# Patient Record
Sex: MALE | Race: BLACK OR AFRICAN AMERICAN | NOT HISPANIC OR LATINO | Employment: OTHER | URBAN - METROPOLITAN AREA
[De-identification: names, ages, dates, MRNs, and addresses within clinical notes are randomized per-mention and may not be internally consistent; named-entity substitution may affect disease eponyms.]

---

## 2017-03-09 ENCOUNTER — HOSPITAL ENCOUNTER (EMERGENCY)
Facility: HOSPITAL | Age: 47
Discharge: HOME | End: 2017-03-10
Attending: EMERGENCY MEDICINE

## 2017-03-09 DIAGNOSIS — S93.402A SPRAIN OF LEFT ANKLE, UNSPECIFIED LIGAMENT, INITIAL ENCOUNTER: ICD-10-CM

## 2017-03-09 DIAGNOSIS — S16.1XXA CERVICAL STRAIN, ACUTE, INITIAL ENCOUNTER: Primary | ICD-10-CM

## 2017-03-09 DIAGNOSIS — M25.572 LEFT ANKLE PAIN: ICD-10-CM

## 2017-03-09 DIAGNOSIS — M62.838 MUSCLE SPASM: ICD-10-CM
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
## 2017-03-09 PROCEDURE — 96372 THER/PROPH/DIAG INJ SC/IM: CPT

## 2017-03-09 PROCEDURE — 99283 EMERGENCY DEPT VISIT LOW MDM: CPT | Mod: 25
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
## 2017-03-10 VITALS
BODY MASS INDEX: 39.37 KG/M2 | RESPIRATION RATE: 18 BRPM | OXYGEN SATURATION: 98 % | SYSTOLIC BLOOD PRESSURE: 141 MMHG | HEIGHT: 66 IN | TEMPERATURE: 98 F | WEIGHT: 245 LBS | DIASTOLIC BLOOD PRESSURE: 91 MMHG | HEART RATE: 95 BPM

## 2017-03-10 PROCEDURE — 63600175 PHARM REV CODE 636 W HCPCS: Performed by: EMERGENCY MEDICINE

## 2017-03-10 RX ORDER — ORPHENADRINE CITRATE 30 MG/ML
60 INJECTION INTRAMUSCULAR; INTRAVENOUS
Status: COMPLETED | OUTPATIENT
Start: 2017-03-10 | End: 2017-03-10

## 2017-03-10 RX ORDER — IBUPROFEN 600 MG/1
600 TABLET ORAL EVERY 6 HOURS PRN
Qty: 30 TABLET | Refills: 0 | OUTPATIENT
Start: 2017-03-10 | End: 2022-10-14

## 2017-03-10 RX ORDER — KETOROLAC TROMETHAMINE 30 MG/ML
30 INJECTION, SOLUTION INTRAMUSCULAR; INTRAVENOUS
Status: COMPLETED | OUTPATIENT
Start: 2017-03-10 | End: 2017-03-10

## 2017-03-10 RX ORDER — DIAZEPAM 5 MG/1
5 TABLET ORAL EVERY 8 HOURS PRN
Qty: 15 TABLET | Refills: 0 | Status: SHIPPED | OUTPATIENT
Start: 2017-03-10 | End: 2017-04-09

## 2017-03-10 RX ADMIN — KETOROLAC TROMETHAMINE 30 MG: 30 INJECTION, SOLUTION INTRAMUSCULAR at 12:03

## 2017-03-10 RX ADMIN — ORPHENADRINE CITRATE 60 MG: 30 INJECTION INTRAMUSCULAR; INTRAVENOUS at 12:03
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
## 2017-03-10 NOTE — DISCHARGE INSTRUCTIONS
Apply ice to areas of pain at 20 minute intervals several times a day.  Wear ACE wrap to decrease pain and swelling in your ankle.  Follow up with your orthopedic for any concerns or worsening symptoms.  Take medications as directed.  DO not take valium while working.  You may take only at bedtime if needed.

## 2017-03-10 NOTE — ED NOTES
Patient identifiers for Rod Ivory checked and correct.  LOC: The patient is awake, alert and aware of environment with an appropriate affect, the patient is oriented x 3 and speaking appropriately.  APPEARANCE: Patient uncomfortable and in no acute distress.  SKIN: The skin is warm and dry, patient has normal skin turgor and moist mucus membranes.  MUSKULOSKELETAL: Patient moving all extremities well. Slight swelling to left ankle. + pedal pulse. Good movement and sensation in toes.  RESPIRATORY: Airway is open and patent, respirations are spontaneous, patient has a normal effort and rate.  ABDOMEN: Soft and non tender to palpation, no distention noted.  NEUROLOGIC: PERRL, facial expression is symmetrical, bilateral hand grasp equal and even, normal sensation in all extremities when touched with a finger.

## 2022-10-14 ENCOUNTER — HOSPITAL ENCOUNTER (EMERGENCY)
Facility: HOSPITAL | Age: 52
Discharge: HOME | End: 2022-10-14
Attending: EMERGENCY MEDICINE
Payer: MEDICAID

## 2022-10-14 VITALS
BODY MASS INDEX: 37.77 KG/M2 | HEART RATE: 78 BPM | DIASTOLIC BLOOD PRESSURE: 70 MMHG | HEIGHT: 66 IN | RESPIRATION RATE: 16 BRPM | OXYGEN SATURATION: 99 % | TEMPERATURE: 99 F | SYSTOLIC BLOOD PRESSURE: 129 MMHG | WEIGHT: 235 LBS

## 2022-10-14 DIAGNOSIS — S20.212A RIB CONTUSION, LEFT, INITIAL ENCOUNTER: ICD-10-CM

## 2022-10-14 DIAGNOSIS — R11.2 NAUSEA AND VOMITING, UNSPECIFIED VOMITING TYPE: ICD-10-CM

## 2022-10-14 DIAGNOSIS — F11.93 OPIOID WITHDRAWAL: Primary | ICD-10-CM

## 2022-10-14 DIAGNOSIS — F17.200 NEEDS SMOKING CESSATION EDUCATION: ICD-10-CM

## 2022-10-14 DIAGNOSIS — S20.219A CHEST WALL CONTUSION: ICD-10-CM

## 2022-10-14 PROCEDURE — 93010 ELECTROCARDIOGRAM REPORT: CPT | Mod: ,,, | Performed by: INTERNAL MEDICINE

## 2022-10-14 PROCEDURE — 63600175 PHARM REV CODE 636 W HCPCS: Performed by: PHYSICIAN ASSISTANT

## 2022-10-14 PROCEDURE — 99284 EMERGENCY DEPT VISIT MOD MDM: CPT | Mod: 25

## 2022-10-14 PROCEDURE — 99285 PR EMERGENCY DEPT VISIT,LEVEL V: ICD-10-PCS | Mod: ,,, | Performed by: PHYSICIAN ASSISTANT

## 2022-10-14 PROCEDURE — 93010 EKG 12-LEAD: ICD-10-PCS | Mod: ,,, | Performed by: INTERNAL MEDICINE

## 2022-10-14 PROCEDURE — 25000003 PHARM REV CODE 250: Performed by: PHYSICIAN ASSISTANT

## 2022-10-14 PROCEDURE — 96372 THER/PROPH/DIAG INJ SC/IM: CPT | Performed by: PHYSICIAN ASSISTANT

## 2022-10-14 PROCEDURE — 93005 ELECTROCARDIOGRAM TRACING: CPT

## 2022-10-14 PROCEDURE — 99285 EMERGENCY DEPT VISIT HI MDM: CPT | Mod: ,,, | Performed by: PHYSICIAN ASSISTANT

## 2022-10-14 RX ORDER — DICYCLOMINE HYDROCHLORIDE 10 MG/1
20 CAPSULE ORAL
Status: COMPLETED | OUTPATIENT
Start: 2022-10-14 | End: 2022-10-14

## 2022-10-14 RX ORDER — DICYCLOMINE HYDROCHLORIDE 20 MG/1
20 TABLET ORAL 2 TIMES DAILY
Qty: 10 TABLET | Refills: 0 | Status: SHIPPED | OUTPATIENT
Start: 2022-10-14 | End: 2022-10-19

## 2022-10-14 RX ORDER — DICYCLOMINE HYDROCHLORIDE 20 MG/1
20 TABLET ORAL 2 TIMES DAILY
Qty: 10 TABLET | Refills: 0 | Status: SHIPPED | OUTPATIENT
Start: 2022-10-14 | End: 2022-10-14 | Stop reason: SDUPTHER

## 2022-10-14 RX ORDER — NALOXONE HYDROCHLORIDE 4 MG/.1ML
SPRAY NASAL
Qty: 1 EACH | Refills: 11 | OUTPATIENT
Start: 2022-10-14 | End: 2023-02-12

## 2022-10-14 RX ORDER — ONDANSETRON 4 MG/1
4 TABLET, ORALLY DISINTEGRATING ORAL
Status: COMPLETED | OUTPATIENT
Start: 2022-10-14 | End: 2022-10-14

## 2022-10-14 RX ORDER — ONDANSETRON 4 MG/1
4 TABLET, ORALLY DISINTEGRATING ORAL EVERY 8 HOURS PRN
Qty: 12 TABLET | Refills: 0 | Status: SHIPPED | OUTPATIENT
Start: 2022-10-14 | End: 2022-10-18

## 2022-10-14 RX ORDER — ONDANSETRON 4 MG/1
4 TABLET, ORALLY DISINTEGRATING ORAL EVERY 8 HOURS PRN
Qty: 9 TABLET | Refills: 0 | Status: SHIPPED | OUTPATIENT
Start: 2022-10-14 | End: 2022-10-14 | Stop reason: SDUPTHER

## 2022-10-14 RX ORDER — METHOCARBAMOL 500 MG/1
1000 TABLET, FILM COATED ORAL EVERY 8 HOURS PRN
Qty: 15 TABLET | Refills: 0 | Status: SHIPPED | OUTPATIENT
Start: 2022-10-14 | End: 2022-10-19

## 2022-10-14 RX ORDER — KETOROLAC TROMETHAMINE 30 MG/ML
15 INJECTION, SOLUTION INTRAMUSCULAR; INTRAVENOUS
Status: COMPLETED | OUTPATIENT
Start: 2022-10-14 | End: 2022-10-14

## 2022-10-14 RX ORDER — IBUPROFEN 600 MG/1
600 TABLET ORAL EVERY 8 HOURS PRN
Qty: 15 TABLET | Refills: 0 | Status: SHIPPED | OUTPATIENT
Start: 2022-10-14 | End: 2022-10-19

## 2022-10-14 RX ADMIN — DICYCLOMINE HYDROCHLORIDE 20 MG: 10 CAPSULE ORAL at 11:10

## 2022-10-14 RX ADMIN — ONDANSETRON 4 MG: 4 TABLET, ORALLY DISINTEGRATING ORAL at 11:10

## 2022-10-14 RX ADMIN — KETOROLAC TROMETHAMINE 15 MG: 30 INJECTION, SOLUTION INTRAMUSCULAR; INTRAVENOUS at 11:10
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
## 2022-10-14 NOTE — ED NOTES
Pt identifiers Rod Ivory were checked and are correct  LOC: The patient is awake, alert, aware of environment with an appropriate affect. Oriented x4, speaking appropriately  APPEARANCE: Pt rates pain to left ribs an 8/10 , in no acute distress, pt is clean and well groomed, clothing properly fastened  SKIN: Skin warm, dry and intact, normal skin turgor, moist mucus membranes  RESPIRATORY: Airway is open and patent, respirations are spontaneous, even and unlabored, normal effort and rate  CARDIAC: Normal rate and rhythm, no peripheral edema noted, capillary refill < 3 seconds, bilateral radial pulses 2+  ABDOMEN: Soft, nontender, nondistended. Bowel sounds present to all four quad of abd on auscultation  NEUROLOGIC: PERRL, facial expression is symmetrical, patient moving all extremities spontaneously, normal sensation in all extremities when touched with a finger.  Follows all commands appropriately  MUSCULOSKELETAL: No obvious deformities.        Rest of the records received and placed in your inbox.

## 2023-02-12 ENCOUNTER — HOSPITAL ENCOUNTER (EMERGENCY)
Facility: HOSPITAL | Age: 53
Discharge: HOME | End: 2023-02-12
Attending: EMERGENCY MEDICINE
Payer: MEDICAID

## 2023-02-12 VITALS
HEART RATE: 68 BPM | TEMPERATURE: 98 F | RESPIRATION RATE: 20 BRPM | DIASTOLIC BLOOD PRESSURE: 91 MMHG | SYSTOLIC BLOOD PRESSURE: 159 MMHG | OXYGEN SATURATION: 98 %

## 2023-02-12 DIAGNOSIS — F19.10 POLYSUBSTANCE ABUSE: Primary | ICD-10-CM

## 2023-02-12 DIAGNOSIS — N17.9 AKI (ACUTE KIDNEY INJURY): ICD-10-CM

## 2023-02-12 DIAGNOSIS — R07.9 CHEST PAIN: ICD-10-CM

## 2023-02-12 DIAGNOSIS — E86.0 DEHYDRATION: ICD-10-CM

## 2023-02-12 DIAGNOSIS — L98.9 BUMPS ON SKIN: ICD-10-CM

## 2023-02-12 LAB
ALBUMIN SERPL BCP-MCNC: 3.7 G/DL (ref 3.5–5.2)
ALP SERPL-CCNC: 75 U/L (ref 55–135)
ALT SERPL W/O P-5'-P-CCNC: 8 U/L (ref 10–44)
ANION GAP SERPL CALC-SCNC: 13 MMOL/L (ref 8–16)
AST SERPL-CCNC: 11 U/L (ref 10–40)
BASOPHILS NFR BLD: 0 % (ref 0–1.9)
BILIRUB SERPL-MCNC: 0.8 MG/DL (ref 0.1–1)
BUN SERPL-MCNC: 14 MG/DL (ref 6–20)
CALCIUM SERPL-MCNC: 9 MG/DL (ref 8.7–10.5)
CHLORIDE SERPL-SCNC: 109 MMOL/L (ref 95–110)
CO2 SERPL-SCNC: 20 MMOL/L (ref 23–29)
CREAT SERPL-MCNC: 1.6 MG/DL (ref 0.5–1.4)
DIFFERENTIAL METHOD: ABNORMAL
EOSINOPHIL NFR BLD: 3 % (ref 0–8)
ERYTHROCYTE [DISTWIDTH] IN BLOOD BY AUTOMATED COUNT: 13.3 % (ref 11.5–14.5)
EST. GFR  (NO RACE VARIABLE): 52 ML/MIN/1.73 M^2
GLUCOSE SERPL-MCNC: 124 MG/DL (ref 70–110)
HCT VFR BLD AUTO: 40 % (ref 40–54)
HGB BLD-MCNC: 13.7 G/DL (ref 14–18)
IMM GRANULOCYTES # BLD AUTO: ABNORMAL K/UL (ref 0–0.04)
IMM GRANULOCYTES NFR BLD AUTO: ABNORMAL % (ref 0–0.5)
LYMPHOCYTES NFR BLD: 20 % (ref 18–48)
MCH RBC QN AUTO: 30 PG (ref 27–31)
MCHC RBC AUTO-ENTMCNC: 34.3 G/DL (ref 32–36)
MCV RBC AUTO: 88 FL (ref 82–98)
MONOCYTES NFR BLD: 6 % (ref 4–15)
NEUTROPHILS NFR BLD: 71 % (ref 38–73)
NRBC BLD-RTO: 0 /100 WBC
PLATELET # BLD AUTO: 252 K/UL (ref 150–450)
PLATELET BLD QL SMEAR: ABNORMAL
PMV BLD AUTO: 11.8 FL (ref 9.2–12.9)
POTASSIUM SERPL-SCNC: 4.3 MMOL/L (ref 3.5–5.1)
PROT SERPL-MCNC: 7.1 G/DL (ref 6–8.4)
RBC # BLD AUTO: 4.57 M/UL (ref 4.6–6.2)
SODIUM SERPL-SCNC: 142 MMOL/L (ref 136–145)
TARGETS BLD QL SMEAR: ABNORMAL
TROPONIN I SERPL DL<=0.01 NG/ML-MCNC: 0.01 NG/ML (ref 0–0.03)
TROPONIN I SERPL DL<=0.01 NG/ML-MCNC: 0.01 NG/ML (ref 0–0.03)
WBC # BLD AUTO: 12.8 K/UL (ref 3.9–12.7)

## 2023-02-12 PROCEDURE — 84484 ASSAY OF TROPONIN QUANT: CPT | Performed by: EMERGENCY MEDICINE

## 2023-02-12 PROCEDURE — 85007 BL SMEAR W/DIFF WBC COUNT: CPT | Performed by: EMERGENCY MEDICINE

## 2023-02-12 PROCEDURE — 85027 COMPLETE CBC AUTOMATED: CPT | Performed by: EMERGENCY MEDICINE

## 2023-02-12 PROCEDURE — 96360 HYDRATION IV INFUSION INIT: CPT

## 2023-02-12 PROCEDURE — 93010 ELECTROCARDIOGRAM REPORT: CPT | Mod: ,,, | Performed by: INTERNAL MEDICINE

## 2023-02-12 PROCEDURE — 93010 EKG 12-LEAD: ICD-10-PCS | Mod: ,,, | Performed by: INTERNAL MEDICINE

## 2023-02-12 PROCEDURE — 80053 COMPREHEN METABOLIC PANEL: CPT | Performed by: EMERGENCY MEDICINE

## 2023-02-12 PROCEDURE — 84484 ASSAY OF TROPONIN QUANT: CPT | Mod: 91 | Performed by: EMERGENCY MEDICINE

## 2023-02-12 PROCEDURE — 99285 EMERGENCY DEPT VISIT HI MDM: CPT | Mod: 25

## 2023-02-12 PROCEDURE — 93005 ELECTROCARDIOGRAM TRACING: CPT

## 2023-02-12 PROCEDURE — 25000003 PHARM REV CODE 250: Performed by: EMERGENCY MEDICINE

## 2023-02-12 RX ORDER — ACETAMINOPHEN 500 MG
1000 TABLET ORAL
Status: COMPLETED | OUTPATIENT
Start: 2023-02-12 | End: 2023-02-12

## 2023-02-12 RX ORDER — ASPIRIN 325 MG
325 TABLET ORAL
Status: DISCONTINUED | OUTPATIENT
Start: 2023-02-12 | End: 2023-02-13 | Stop reason: HOSPADM

## 2023-02-12 RX ORDER — NALOXONE HYDROCHLORIDE 1 MG/ML
INJECTION INTRAMUSCULAR; INTRAVENOUS; SUBCUTANEOUS
Qty: 2 ML | Refills: 11 | Status: SHIPPED | OUTPATIENT
Start: 2023-02-12 | End: 2023-02-12 | Stop reason: SDUPTHER

## 2023-02-12 RX ORDER — NALOXONE HYDROCHLORIDE 1 MG/ML
INJECTION INTRAMUSCULAR; INTRAVENOUS; SUBCUTANEOUS
Qty: 2 ML | Refills: 11 | Status: SHIPPED | OUTPATIENT
Start: 2023-02-12

## 2023-02-12 RX ORDER — NALOXONE HYDROCHLORIDE 4 MG/.1ML
SPRAY NASAL
Qty: 1 EACH | Refills: 11 | Status: SHIPPED | OUTPATIENT
Start: 2023-02-12 | End: 2023-02-12 | Stop reason: SDUPTHER

## 2023-02-12 RX ADMIN — ACETAMINOPHEN 1000 MG: 500 TABLET ORAL at 07:02

## 2023-02-12 RX ADMIN — SODIUM CHLORIDE 1000 ML: 0.9 INJECTION, SOLUTION INTRAVENOUS at 07:02

## 2023-02-13 NOTE — PROVIDER PROGRESS NOTES - EMERGENCY DEPT.
Encounter Date: 2/12/2023    ED Physician Progress Notes            This is an assumption of care note.     Upon shift change, the patient was transferred to me from Barb Bella MD at 8:00 PM in stable condition.     Rod Ivory is a 52 y.o. male who  has a past medical history of Hypertension.  Patient presented to ED with chief complaint of chest pain after smoking crack cocaine.  Workup currently in progress.   Labs including troponin pending at this time.    Update:   No acute events during shift.    Vitals have remained stable.  Initial and delta troponin negative.    Patient requesting admission to rehab facility. Denies SI/HI.   Will provide resources on DC. Narcan prescribed, instructed on use.      VITALS:  Vitals:    02/12/23 1810 02/12/23 1814 02/12/23 2103 02/12/23 2202   BP:   (!) 151/74 137/85   Pulse: 72 77 61 61   Resp: (!) 23 15 19 (!) 22   Temp:       SpO2: 97% 96% 97% 98%         EKG interpretation by ED attending physician:  NSR, rate 98, occasional PACs, RBBB, no ST changes, no acute ischemia, normal intervals.  Compared with prior EKG dated 10/2022, grossly stable without significant change.      IMAGING:  Imaging Results              X-Ray Chest AP Portable (Final result)  Result time 02/12/23 20:09:34      Final result by Raymond Ross MD (02/12/23 20:09:34)                   Impression:      1. No acute cardiopulmonary process.      Electronically signed by: Raymond Ross MD  Date:    02/12/2023  Time:    20:09               Narrative:    EXAMINATION:  XR CHEST AP PORTABLE    CLINICAL HISTORY:  Chest pain, unspecified    TECHNIQUE:  Single frontal view of the chest was performed.    COMPARISON:  10/14/2022    FINDINGS:  The cardiomediastinal silhouette is not enlarged.  There is no pleural effusion.  The trachea is midline.  The lungs are symmetrically expanded bilaterally without evidence of acute parenchymal process. No large focal consolidation seen.  There may be a  calcified granuloma projected over the right midlung zone versus material extraneous to the patient.  There is no pneumothorax.  The osseous structures are remarkable for degenerative changes..                                        LABS:  Labs Reviewed   CBC W/ AUTO DIFFERENTIAL - Abnormal; Notable for the following components:       Result Value    WBC 12.80 (*)     RBC 4.57 (*)     Hemoglobin 13.7 (*)     All other components within normal limits   COMPREHENSIVE METABOLIC PANEL - Abnormal; Notable for the following components:    CO2 20 (*)     Glucose 124 (*)     Creatinine 1.6 (*)     ALT 8 (*)     eGFR 52 (*)     All other components within normal limits   TROPONIN I   TROPONIN I         MEDICATIONS:  Medications   aspirin tablet 325 mg (325 mg Oral Not Given 2/12/23 1730)   sodium chloride 0.9% bolus 1,000 mL 1,000 mL (0 mLs Intravenous Stopped 2/12/23 2019)   acetaminophen tablet 1,000 mg (1,000 mg Oral Given 2/12/23 1927)         IMPRESSION:  1. Polysubstance abuse    2. Chest pain    3. CAPO (acute kidney injury)    4. Bumps on skin    5. Dehydration           DISCHARGE MEDICATIONS:  Current Discharge Medication List            DISPOSITION:  D/C in stable condition.

## 2023-02-13 NOTE — ED PROVIDER NOTES
Encounter Date: 2/12/2023       History     Chief Complaint   Patient presents with    Chest Pain     Pt presents to ED today via [facility] from [facility]   C/o chest pain after snorting crack  Pt reports he is also detoxing from heroin     HPI  52-year-old male with a history of hypertension, substance use disorder, presenting with chest pain after snorting crack, smoking weed, and snorting heroin.  Patient denies chest pain currently.  Not interested in quitting.  Denies shortness of breath.  Also complaining of multiple lesions all over his body for the past few months.  They are bothersome, he does not know where they came from.  Has not seen a physician because it.  Review of patient's allergies indicates:  No Known Allergies  Past Medical History:   Diagnosis Date    Hypertension      Past Surgical History:   Procedure Laterality Date    ANKLE SURGERY Left      No family history on file.  Social History     Tobacco Use    Smoking status: Some Days     Packs/day: 0.25     Types: Cigarettes    Smokeless tobacco: Never   Substance Use Topics    Alcohol use: No    Drug use: Yes     Types: Marijuana     Comment: heroin     Review of Systems   Constitutional:  Negative for fever.   HENT:  Negative for sore throat.    Respiratory:  Negative for shortness of breath.    Cardiovascular:  Positive for chest pain.   Gastrointestinal:  Negative for nausea.   Genitourinary:  Negative for dysuria.   Musculoskeletal:  Negative for back pain.   Skin:  Negative for rash.   Neurological:  Negative for weakness.   Hematological:  Does not bruise/bleed easily.     Physical Exam     Initial Vitals   BP Pulse Resp Temp SpO2   02/12/23 1704 02/12/23 1704 02/12/23 1704 02/12/23 1721 02/12/23 1704   (!) 90/50 110 18 97.8 °F (36.6 °C) 96 %      MAP       --                Physical Exam    Nursing note and vitals reviewed.  Constitutional:   Narrative: Triage vital signs reviewed.     Constitutional: Well-nourished, well-developed. Not in  acute distress.     HEENT: Normocephalic, atraumatic. Moist mucous membranes.     Eyes: No conjunctival injection.     Resp: Normal respiratory effort, breathing unlabored.     Cardio: Regular rate and rhythm.     GI: Abdomen non-distended.      MSK: Extremities without any deformities noted. No lower extremity edema.     Skin: Warm and dry.  Multiple circular lesions all over body.  Area behind left ear open, not draining.  No signs of infection.     Neuro: Awake and alert. Moves all extremities.         ED Course   Procedures  Labs Reviewed   CBC W/ AUTO DIFFERENTIAL - Abnormal; Notable for the following components:       Result Value    WBC 12.80 (*)     RBC 4.57 (*)     Hemoglobin 13.7 (*)     All other components within normal limits   COMPREHENSIVE METABOLIC PANEL - Abnormal; Notable for the following components:    CO2 20 (*)     Glucose 124 (*)     Creatinine 1.6 (*)     ALT 8 (*)     eGFR 52 (*)     All other components within normal limits   TROPONIN I   TROPONIN I     EKG Readings: (Independently Interpreted)   Sinus rhythm, PACs, L axis, RBBB seen previously. No STEMI.     Imaging Results              X-Ray Chest AP Portable (In process)                      Medications   aspirin tablet 325 mg (325 mg Oral Not Given 2/12/23 1730)   sodium chloride 0.9% bolus 1,000 mL 1,000 mL (1,000 mLs Intravenous New Bag 2/12/23 1919)   acetaminophen tablet 1,000 mg (1,000 mg Oral Given 2/12/23 1927)     Medical Decision Making:   History:   Old Medical Records: I decided to obtain old medical records.  Old Records Summarized: records from previous admission(s).  Initial Assessment:   Nontoxic appearing.  Patient more concern with the lesions on his body than his chest pain.  States that he smoked heroin, marijuana, started crack.  Denies current chest pain.  Not interested in quitting.  Differential Diagnosis:   ACS, Pna, ptx   Clinical Tests:   Lab Tests: Ordered and Reviewed  Radiological Study: Ordered and  Reviewed  Medical Tests: Ordered and Reviewed  ED Management:  52m pw chest pain after using multiple drugs. Initially hypotensive. Labs show slight CAPO. Leukocytosis improved from previous. Xr pending. Will obtain 2 trops and if neg, dc with narcan. Skin lesions are not infected; will refer to Derm. Signout given to oncoming physician re: 2nd trop and XR. And disposition pending that.                         Clinical Impression:   Final diagnoses:  [R07.9] Chest pain  [F19.10] Polysubstance abuse (Primary)  [N17.9] CAPO (acute kidney injury)  [L98.9] Bumps on skin  [E86.0] Dehydration               Barb Bella MD  02/12/23 2007

## 2023-02-13 NOTE — DISCHARGE INSTRUCTIONS
You came in with chest pain and your workup showed you are dehydrated. Please stop using drugs. Narcan has been prescribed to you. It is an opioid reversal agent.  Please fill this prescription to have on hand at all times in the event of a future overdose.    We will also provide a list of resources for outpatient rehabilitation if desired.  The only way to prevent and overdoses to stop using entirely.    A Dermatology referral has been placed for you to evaluate the bumps on your skin. Please see your doctor within the week to follow up on your visit to the ED. If you do not have a primary doctor, call the number below to find one.     [facility]  [phone]  [address], [facility] [address]   [facility] [address]

## 2023-02-17 ENCOUNTER — HOSPITAL ENCOUNTER (EMERGENCY)
Facility: HOSPITAL | Age: 53
Discharge: HOME | End: 2023-02-17
Attending: EMERGENCY MEDICINE
Payer: MEDICAID

## 2023-02-17 VITALS
DIASTOLIC BLOOD PRESSURE: 79 MMHG | WEIGHT: 235 LBS | HEIGHT: 66 IN | RESPIRATION RATE: 20 BRPM | OXYGEN SATURATION: 100 % | TEMPERATURE: 98 F | SYSTOLIC BLOOD PRESSURE: 154 MMHG | HEART RATE: 68 BPM | BODY MASS INDEX: 37.77 KG/M2

## 2023-02-17 DIAGNOSIS — I49.1 PAC (PREMATURE ATRIAL CONTRACTION): Primary | ICD-10-CM

## 2023-02-17 DIAGNOSIS — Z00.8 MEDICAL CLEARANCE FOR PSYCHIATRIC ADMISSION: ICD-10-CM

## 2023-02-17 DIAGNOSIS — R21 SKIN ERUPTION: ICD-10-CM

## 2023-02-17 LAB
ALBUMIN SERPL BCP-MCNC: 3.5 G/DL (ref 3.5–5.2)
ALP SERPL-CCNC: 76 U/L (ref 55–135)
ALT SERPL W/O P-5'-P-CCNC: 8 U/L (ref 10–44)
ANION GAP SERPL CALC-SCNC: 10 MMOL/L (ref 8–16)
AST SERPL-CCNC: 13 U/L (ref 10–40)
BASOPHILS # BLD AUTO: 0.06 K/UL (ref 0–0.2)
BASOPHILS NFR BLD: 0.5 % (ref 0–1.9)
BILIRUB SERPL-MCNC: 0.4 MG/DL (ref 0.1–1)
BUN SERPL-MCNC: 12 MG/DL (ref 6–20)
CALCIUM SERPL-MCNC: 8.5 MG/DL (ref 8.7–10.5)
CHLORIDE SERPL-SCNC: 109 MMOL/L (ref 95–110)
CO2 SERPL-SCNC: 25 MMOL/L (ref 23–29)
CREAT SERPL-MCNC: 1 MG/DL (ref 0.5–1.4)
DIFFERENTIAL METHOD: ABNORMAL
EOSINOPHIL # BLD AUTO: 0.3 K/UL (ref 0–0.5)
EOSINOPHIL NFR BLD: 2.9 % (ref 0–8)
ERYTHROCYTE [DISTWIDTH] IN BLOOD BY AUTOMATED COUNT: 13.5 % (ref 11.5–14.5)
EST. GFR  (NO RACE VARIABLE): >60 ML/MIN/1.73 M^2
GLUCOSE SERPL-MCNC: 74 MG/DL (ref 70–110)
HCT VFR BLD AUTO: 40.6 % (ref 40–54)
HGB BLD-MCNC: 13.6 G/DL (ref 14–18)
IMM GRANULOCYTES # BLD AUTO: 0.04 K/UL (ref 0–0.04)
IMM GRANULOCYTES NFR BLD AUTO: 0.4 % (ref 0–0.5)
LYMPHOCYTES # BLD AUTO: 2.7 K/UL (ref 1–4.8)
LYMPHOCYTES NFR BLD: 24.4 % (ref 18–48)
MCH RBC QN AUTO: 29.8 PG (ref 27–31)
MCHC RBC AUTO-ENTMCNC: 33.5 G/DL (ref 32–36)
MCV RBC AUTO: 89 FL (ref 82–98)
MONOCYTES # BLD AUTO: 0.5 K/UL (ref 0.3–1)
MONOCYTES NFR BLD: 4 % (ref 4–15)
NEUTROPHILS # BLD AUTO: 7.6 K/UL (ref 1.8–7.7)
NEUTROPHILS NFR BLD: 67.8 % (ref 38–73)
NRBC BLD-RTO: 0 /100 WBC
PLATELET # BLD AUTO: 253 K/UL (ref 150–450)
PMV BLD AUTO: 12.4 FL (ref 9.2–12.9)
POTASSIUM SERPL-SCNC: 3.8 MMOL/L (ref 3.5–5.1)
PROT SERPL-MCNC: 6.8 G/DL (ref 6–8.4)
RBC # BLD AUTO: 4.57 M/UL (ref 4.6–6.2)
SODIUM SERPL-SCNC: 144 MMOL/L (ref 136–145)
WBC # BLD AUTO: 11.22 K/UL (ref 3.9–12.7)

## 2023-02-17 PROCEDURE — 80053 COMPREHEN METABOLIC PANEL: CPT | Performed by: EMERGENCY MEDICINE

## 2023-02-17 PROCEDURE — 93010 EKG 12-LEAD: ICD-10-PCS | Mod: ,,, | Performed by: INTERNAL MEDICINE

## 2023-02-17 PROCEDURE — 99284 EMERGENCY DEPT VISIT MOD MDM: CPT

## 2023-02-17 PROCEDURE — 93005 ELECTROCARDIOGRAM TRACING: CPT

## 2023-02-17 PROCEDURE — 85025 COMPLETE CBC W/AUTO DIFF WBC: CPT | Performed by: EMERGENCY MEDICINE

## 2023-02-17 PROCEDURE — 93010 ELECTROCARDIOGRAM REPORT: CPT | Mod: ,,, | Performed by: INTERNAL MEDICINE

## 2023-02-17 RX ORDER — PRENATAL VIT 91/IRON/FOLIC/DHA 28-975-200
COMBINATION PACKAGE (EA) ORAL 2 TIMES DAILY
Qty: 15 G | Refills: 5 | Status: SHIPPED | OUTPATIENT
Start: 2023-02-17

## 2023-02-17 NOTE — ED TRIAGE NOTES
Pt presents to ED from [facility] for medical clearance. Pt has a history of irregular HR and the rehab facility requires pt to be medically cleared prior to accepting him to start his rehab. Pt denies cp, sob, n/v/d. Pt states his only concern is the abnormal skin growths which he has developed over the last few weeks.

## 2023-02-17 NOTE — DISCHARGE INSTRUCTIONS
Your heart rate is irregular because you have premature atrial contractions.  No further medical need urgently at this time.  Your clear to go back to the facility.  Return if you have chest pain or passing out or shortness of breath.

## 2023-02-17 NOTE — ED PROVIDER NOTES
Encounter Date: 2/17/2023       History     Chief Complaint   Patient presents with    Irregular Heart Beat     Pt at [facility] for treatment after an overdose this past week where he was seen at [facility] and cleared, staff was concerned because during assessment pt was noted to have an irregular heartbeat. Pt states that he has a hx of A-fib and has no complaint at all. He denies CP no SOB no nausea or vomiting. Staff requesting medical clearance.      Chief complaint:  Medical clearance     History of present illness:  Patient presents from rehab for recent overdose treatment.  Patient is asymptomatic today in terms of new symptoms.  No chest pain or palpitations.  No shortness of breath.  No lower extremity edema.  No history of DVT/PE.  No nausea or vomiting or abdominal pain.  Reportedly, patient states he has a history of atrial fibrillation.  He had an irregular heart beat noted on exam at the facility where he is.  They sent him here for further evaluation and medical clearance.    Review of patient's allergies indicates:  No Known Allergies  Past Medical History:   Diagnosis Date    Hypertension      Past Surgical History:   Procedure Laterality Date    ANKLE SURGERY Left      No family history on file.  Social History     Tobacco Use    Smoking status: Some Days     Packs/day: 0.25     Types: Cigarettes    Smokeless tobacco: Never   Substance Use Topics    Alcohol use: No    Drug use: Yes     Types: Marijuana     Comment: heroin     Review of Systems   Constitutional:  Negative for fever.   HENT:  Negative for sore throat.    Respiratory:  Negative for shortness of breath.    Cardiovascular:  Negative for chest pain.   Gastrointestinal:  Negative for nausea.   Genitourinary:  Negative for dysuria.   Musculoskeletal:  Negative for back pain.   Skin:  Negative for rash.   Neurological:  Negative for weakness.   Hematological:  Does not bruise/bleed easily.   Psychiatric/Behavioral:  Negative for agitation and  behavioral problems.      Physical Exam     Initial Vitals [02/17/23 1226]   BP Pulse Resp Temp SpO2   (!) 176/98 78 18 98.1 °F (36.7 °C) 98 %      MAP       --         Physical Exam    Nursing note and vitals reviewed.  Constitutional: He appears well-developed and well-nourished. He is not diaphoretic. No distress.   HENT:   Head: Normocephalic and atraumatic.   Right Ear: External ear normal.   Left Ear: External ear normal.   Nose: Nose normal.   Mouth/Throat: Oropharynx is clear and moist.   Eyes: Conjunctivae and EOM are normal. Pupils are equal, round, and reactive to light. Right eye exhibits no discharge. Left eye exhibits no discharge. No scleral icterus.   Neck: Neck supple. No JVD present.   Normal range of motion.  Cardiovascular:  Normal rate, normal heart sounds and intact distal pulses.     Exam reveals no gallop and no friction rub.       No murmur heard.  Normal rate, irregular rhythm.   Pulmonary/Chest: Breath sounds normal. No stridor. No respiratory distress. He has no wheezes. He has no rhonchi. He has no rales. He exhibits no tenderness.   Abdominal: Abdomen is soft. Bowel sounds are normal. He exhibits no distension and no mass. There is no abdominal tenderness. There is no rebound and no guarding.   Musculoskeletal:         General: No tenderness or edema. Normal range of motion.      Cervical back: Normal range of motion and neck supple.     Neurological: He is alert and oriented to person, place, and time. He has normal strength. No cranial nerve deficit or sensory deficit.   Skin: Skin is warm and dry. No rash noted. No erythema. No pallor.   Psychiatric: He has a normal mood and affect. His behavior is normal. Judgment and thought content normal.       ED Course   Procedures  Labs Reviewed   CBC W/ AUTO DIFFERENTIAL - Abnormal; Notable for the following components:       Result Value    RBC 4.57 (*)     Hemoglobin 13.6 (*)     All other components within normal limits   COMPREHENSIVE  "METABOLIC PANEL - Abnormal; Notable for the following components:    Calcium 8.5 (*)     ALT 8 (*)     All other components within normal limits     EKG Readings: (Independently Interpreted)   Initial Reading: No STEMI. Ectopy: No Ectopy.   EKG reviewed and interpreted by me shows sinus rhythm with PACs.  Rate of 74.  There is a right bundle-branch block and left axis deviation.  There is left axis deviation.  There are no ST segment or T-wave ischemic findings.     ECG Results              EKG 12-lead (Final result)  Result time 02/17/23 15:18:27      Final result by Interface, Lab In [facility] (02/17/23 15:18:27)                   Narrative:    Test Reason : Z00.8,    Vent. Rate : 074 BPM     Atrial Rate : 074 BPM     P-R Int : 138 ms          QRS Dur : 132 ms      QT Int : 368 ms       P-R-T Axes : 070 -53 041 degrees     QTc Int : 408 ms    Sinus rhythm with Premature atrial complexes  Left axis deviation  Right bundle branch block  Abnormal ECG  When compared with ECG of 12-FEB-2023 17:18,  Significant changes have occurred  Confirmed by Gerhard Portillo MD ([id number]) on 2/17/2023 3:18:14 PM    Referred By: [email or web address]   SELF           Confirmed By:Gerhard Portillo MD                                  Imaging Results    None          Medications - No data to display  Medical Decision Making:   ED Management:  This is the emergent evaluation of a 52-year-old male presents emergency department for evaluation of a 52-year-old male presents emergency department with no complaints.  Using for medical clearance.  He was found to have a "irregular heartbeat" on evaluation at detox facility.  Differential diagnosis at the time of initial evaluation included, but was not limited to:   PACs, PVCs, atrial fibrillation, atrial flutter.  The patient has EKG consistent with right bundle-branch block and PACs.  He does not have any evidence of atrial fibrillation or atrial flutter.  He is stable for discharge.  Advised return for any new or " worsening symptoms.  Labs without acute abnormalities.    Of note, patient at discharge as nurse if I could evaluate his skin lesions.  He states that he has a lesion to his neck and umbilicus.  Unclear what these are but they do not appear to be infection/cellulitis/abscess.  No evidence of herpetic lesions.  Most consistent with fungal infection.  I have referred him to Dermatology and prescribed topical antifungal medication.                        Clinical Impression:   Final diagnoses:  [Z00.8] Medical clearance for psychiatric admission  [I49.1] PAC (premature atrial contraction) (Primary)  [R21] Skin eruption        ED Disposition Condition    Discharge Stable          ED Prescriptions       Medication Sig Dispense Start Date End Date Auth. Provider    terbinafine HCL (LAMISIL) 1 % cream Apply topically 2 (two) times daily. 15 g 2/17/2023 -- Dony Ojeda Jr., MD          Follow-up Information       Follow up With Specialties Details Why Contact Info    [facility] Dermatology Call in 1 week  [address] [facility] [address]  [phone]             Dony Ojeda Jr., MD  02/17/23 2445       Dony Ojeda Jr., MD  02/18/23 [id number]

## 2024-08-04 ENCOUNTER — HOSPITAL ENCOUNTER (EMERGENCY)
Facility: HOSPITAL | Age: 54
Discharge: HOME | End: 2024-08-04
Attending: EMERGENCY MEDICINE
Payer: MEDICAID

## 2024-08-04 VITALS
TEMPERATURE: 98 F | BODY MASS INDEX: 38.57 KG/M2 | HEIGHT: 66 IN | RESPIRATION RATE: 20 BRPM | WEIGHT: 240 LBS | DIASTOLIC BLOOD PRESSURE: 93 MMHG | HEART RATE: 96 BPM | SYSTOLIC BLOOD PRESSURE: 167 MMHG | OXYGEN SATURATION: 96 %

## 2024-08-04 DIAGNOSIS — R07.9 CHEST PAIN: ICD-10-CM

## 2024-08-04 DIAGNOSIS — J22 LOWER RESPIRATORY TRACT INFECTION: Primary | ICD-10-CM

## 2024-08-04 LAB
ALBUMIN SERPL BCP-MCNC: 3.9 G/DL (ref 3.5–5.2)
ALP SERPL-CCNC: 113 U/L (ref 55–135)
ALT SERPL W/O P-5'-P-CCNC: 17 U/L (ref 10–44)
ANION GAP SERPL CALC-SCNC: 12 MMOL/L (ref 8–16)
AST SERPL-CCNC: 16 U/L (ref 10–40)
BASOPHILS # BLD AUTO: ABNORMAL K/UL (ref 0–0.2)
BASOPHILS NFR BLD: 0 % (ref 0–1.9)
BILIRUB SERPL-MCNC: 0.4 MG/DL (ref 0.1–1)
BUN SERPL-MCNC: 15 MG/DL (ref 6–20)
CALCIUM SERPL-MCNC: 10 MG/DL (ref 8.7–10.5)
CHLORIDE SERPL-SCNC: 105 MMOL/L (ref 95–110)
CO2 SERPL-SCNC: 25 MMOL/L (ref 23–29)
CREAT SERPL-MCNC: 1.2 MG/DL (ref 0.5–1.4)
CTP QC/QA: YES
CTP QC/QA: YES
DIFFERENTIAL METHOD BLD: ABNORMAL
EOSINOPHIL # BLD AUTO: ABNORMAL K/UL (ref 0–0.5)
EOSINOPHIL NFR BLD: 43 % (ref 0–8)
ERYTHROCYTE [DISTWIDTH] IN BLOOD BY AUTOMATED COUNT: 13.5 % (ref 11.5–14.5)
EST. GFR  (NO RACE VARIABLE): >60 ML/MIN/1.73 M^2
GLUCOSE SERPL-MCNC: 97 MG/DL (ref 70–110)
HCT VFR BLD AUTO: 42.9 % (ref 40–54)
HGB BLD-MCNC: 15.1 G/DL (ref 14–18)
IMM GRANULOCYTES # BLD AUTO: ABNORMAL K/UL (ref 0–0.04)
IMM GRANULOCYTES NFR BLD AUTO: ABNORMAL % (ref 0–0.5)
LYMPHOCYTES # BLD AUTO: ABNORMAL K/UL (ref 1–4.8)
LYMPHOCYTES NFR BLD: 19 % (ref 18–48)
MCH RBC QN AUTO: 31.4 PG (ref 27–31)
MCHC RBC AUTO-ENTMCNC: 35.2 G/DL (ref 32–36)
MCV RBC AUTO: 89 FL (ref 82–98)
MONOCYTES # BLD AUTO: ABNORMAL K/UL (ref 0.3–1)
MONOCYTES NFR BLD: 0 % (ref 4–15)
NEUTROPHILS # BLD AUTO: ABNORMAL K/UL (ref 1.8–7.7)
NEUTROPHILS NFR BLD: 38 % (ref 38–73)
NRBC BLD-RTO: 0 /100 WBC
PLATELET # BLD AUTO: 244 K/UL (ref 150–450)
PLATELET BLD QL SMEAR: ABNORMAL
PMV BLD AUTO: 12.1 FL (ref 9.2–12.9)
POC MOLECULAR INFLUENZA A AGN: NEGATIVE
POC MOLECULAR INFLUENZA B AGN: NEGATIVE
POTASSIUM SERPL-SCNC: 4 MMOL/L (ref 3.5–5.1)
PROT SERPL-MCNC: 8 G/DL (ref 6–8.4)
RBC # BLD AUTO: 4.81 M/UL (ref 4.6–6.2)
SARS-COV-2 RDRP RESP QL NAA+PROBE: NEGATIVE
SODIUM SERPL-SCNC: 142 MMOL/L (ref 136–145)
TROPONIN I SERPL DL<=0.01 NG/ML-MCNC: <0.006 NG/ML (ref 0–0.03)
WBC # BLD AUTO: 20.11 K/UL (ref 3.9–12.7)

## 2024-08-04 PROCEDURE — 85027 COMPLETE CBC AUTOMATED: CPT | Performed by: EMERGENCY MEDICINE

## 2024-08-04 PROCEDURE — 99284 EMERGENCY DEPT VISIT MOD MDM: CPT | Mod: 25

## 2024-08-04 PROCEDURE — 96374 THER/PROPH/DIAG INJ IV PUSH: CPT

## 2024-08-04 PROCEDURE — 85007 BL SMEAR W/DIFF WBC COUNT: CPT | Performed by: EMERGENCY MEDICINE

## 2024-08-04 PROCEDURE — 63600175 PHARM REV CODE 636 W HCPCS: Performed by: EMERGENCY MEDICINE

## 2024-08-04 PROCEDURE — 25000003 PHARM REV CODE 250: Performed by: EMERGENCY MEDICINE

## 2024-08-04 PROCEDURE — 80053 COMPREHEN METABOLIC PANEL: CPT | Performed by: EMERGENCY MEDICINE

## 2024-08-04 PROCEDURE — 87635 SARS-COV-2 COVID-19 AMP PRB: CPT | Performed by: EMERGENCY MEDICINE

## 2024-08-04 PROCEDURE — 87502 INFLUENZA DNA AMP PROBE: CPT

## 2024-08-04 PROCEDURE — 84484 ASSAY OF TROPONIN QUANT: CPT | Performed by: EMERGENCY MEDICINE

## 2024-08-04 RX ORDER — BENZONATATE 200 MG/1
200 CAPSULE ORAL 3 TIMES DAILY PRN
Qty: 30 CAPSULE | Refills: 0 | Status: SHIPPED | OUTPATIENT
Start: 2024-08-04 | End: 2024-08-14

## 2024-08-04 RX ORDER — ASPIRIN 325 MG
325 TABLET ORAL
Status: COMPLETED | OUTPATIENT
Start: 2024-08-04 | End: 2024-08-04

## 2024-08-04 RX ORDER — NAPROXEN SODIUM 220 MG/1
81 TABLET, FILM COATED ORAL DAILY
Qty: 30 TABLET | Refills: 0 | Status: SHIPPED | OUTPATIENT
Start: 2024-08-04 | End: 2025-08-04

## 2024-08-04 RX ORDER — KETOROLAC TROMETHAMINE 30 MG/ML
15 INJECTION, SOLUTION INTRAMUSCULAR; INTRAVENOUS
Status: COMPLETED | OUTPATIENT
Start: 2024-08-04 | End: 2024-08-04

## 2024-08-04 RX ORDER — AZITHROMYCIN 250 MG/1
250 TABLET, FILM COATED ORAL DAILY
Qty: 6 TABLET | Refills: 0 | Status: SHIPPED | OUTPATIENT
Start: 2024-08-04

## 2024-08-04 RX ADMIN — KETOROLAC TROMETHAMINE 15 MG: 30 INJECTION, SOLUTION INTRAMUSCULAR; INTRAVENOUS at 06:08

## 2024-08-04 RX ADMIN — ASPIRIN 325 MG ORAL TABLET 325 MG: 325 PILL ORAL at 06:08

## 2024-08-04 NOTE — DISCHARGE INSTRUCTIONS
We were not able to complete your chest pain workup here in the emergency room.  You can return any time.  You absolutely need to follow up with the regular doctor and/or cardiologist for further evaluation of your chest pain.  We did prescribe you medicines at [facility] pharmacy in case you have a infection.  You are welcome to return to the ER at any time.

## 2024-08-04 NOTE — Clinical Note
"Date: 8/4/2024  Patient: Rod Ivory  Admitted: 8/4/2024  3:58 PM  Attending Provider: Francisco Llanos MD    Rod Ivory or his authorized caregiver has made the decision for the patient to leave the emergency department against the adv ice of his attending physician. He or his authorized caregiver has been informed and understands the inherent risks, including death, heart attack pneumonia.  He or his authorized caregiver has decided to accept the responsibility for this decision.  Rod Ivory and all necessary parties have been advised that he may return for further evaluation or treatment. His condition at time of discharge was fair.  Rod Ivory had current vital signs as follows:  BP (!) 167/93 (BP Location: Rig ht arm)   Pulse 96   Temp 97.9 °F (36.6 °C) (Oral)   Resp 20   Ht 5' 6" (1.676 m)   Wt 108.9 kg (240 lb)   "

## 2024-08-04 NOTE — ED PROVIDER NOTES
Encounter Date: 8/4/2024       History     Chief Complaint   Patient presents with    Chest Pain     Patient reports mid sternal chest pain, cough with clear sputum that started on Wednesday. He reports body aches on Monday and SOB on Friday. He denies any fever, nausea, or vomiting. He reports taking Alkalizer cold medication.      Patient is a 54-year-old male with a past medical history of noncompliant hypertension cigar smoker polysubstance abuse who presents emergency room for evaluation of a cough that is been present for the past week and a half and also has some chest discomfort when he coughs.  He denies any significant shortness of breath or pain radiating to his back neck or arms.  The patient denies any history of coronary artery disease.  He denies any history of DVT lower extremity edema pneumothorax and denies any trauma.  He has no abdominal pain nausea vomiting diaphoresis dyspnea on exertion or worsening chest pain with exertion.      Review of patient's allergies indicates:  No Known Allergies  Past Medical History:   Diagnosis Date    Hypertension      Past Surgical History:   Procedure Laterality Date    ANKLE SURGERY Left      No family history on file.  Social History     Tobacco Use    Smoking status: Some Days     Current packs/day: 0.25     Types: Cigarettes    Smokeless tobacco: Never   Substance Use Topics    Alcohol use: No    Drug use: Yes     Types: Marijuana     Comment: heroin     Review of Systems   Constitutional:  Negative for appetite change, fatigue and fever.   HENT:  Negative for ear pain, rhinorrhea and sore throat.    Eyes:  Negative for pain and visual disturbance.   Respiratory:  Negative for cough, choking, shortness of breath, wheezing and stridor.    Cardiovascular:  Negative for chest pain.   Gastrointestinal:  Negative for abdominal pain, diarrhea, nausea and vomiting.   Genitourinary:  Negative for difficulty urinating and flank pain.   Musculoskeletal:  Negative for  arthralgias.   Skin:  Negative for rash.   Neurological:  Negative for weakness, numbness and headaches.   All other systems reviewed and are negative.      Physical Exam     Initial Vitals [08/04/24 1602]   BP Pulse Resp Temp SpO2   (!) 184/96 101 18 97.9 °F (36.6 °C) 99 %      MAP       --         Physical Exam    Nursing note and vitals reviewed.  Constitutional: He appears well-developed.   HENT:   Head: Normocephalic and atraumatic.   Mouth/Throat: Oropharynx is clear and moist.   Eyes: Conjunctivae and EOM are normal. Pupils are equal, round, and reactive to light.   Neck: Neck supple. No JVD present.   Cardiovascular:  Normal rate, regular rhythm, normal heart sounds and intact distal pulses.     Exam reveals no gallop and no friction rub.       No murmur heard.  Pulmonary/Chest: Breath sounds normal. He has no wheezes. He has no rhonchi. He has no rales. He exhibits tenderness.   Abdominal: Abdomen is soft. Bowel sounds are normal. There is no abdominal tenderness. There is no rebound.   Musculoskeletal:         General: No edema. Normal range of motion.      Cervical back: Neck supple.     Neurological: He is alert and oriented to person, place, and time. He has normal strength. No cranial nerve deficit or sensory deficit.   Skin: Skin is warm and dry.   Psychiatric: He has a normal mood and affect.         ED Course   Procedures  Labs Reviewed   CBC W/ AUTO DIFFERENTIAL - Abnormal       Result Value    WBC 20.11 (*)     RBC 4.81      Hemoglobin 15.1      Hematocrit 42.9      MCV 89      MCH 31.4 (*)     MCHC 35.2      RDW 13.5      Platelets 244      MPV 12.1      Immature Granulocytes Test Not Performed      Gran # (ANC) Test Not Performed      Immature Grans (Abs) Test Not Performed      Lymph # Test Not Performed      Mono # Test Not Performed      Eos # Test Not Performed      Baso # Test Not Performed      nRBC 0      Gran % 38.0      Lymph % 19.0      Mono % 0.0 (*)     Eosinophil % 43.0 (*)      Basophil % 0.0      Platelet Estimate Appears normal      Differential Method Manual     COMPREHENSIVE METABOLIC PANEL    Sodium 142      Potassium 4.0      Chloride 105      CO2 25      Glucose 97      BUN 15      Creatinine 1.2      Calcium 10.0      Total Protein 8.0      Albumin 3.9      Total Bilirubin 0.4      Alkaline Phosphatase 113      AST 16      ALT 17      eGFR >60      Anion Gap 12     TROPONIN I    Troponin I <0.006     SARS-COV-2 RDRP GENE    POC Rapid COVID Negative       Acceptable Yes     POCT INFLUENZA A/B MOLECULAR    POC Molecular Influenza A Ag Negative      POC Molecular Influenza B Ag Negative       Acceptable Yes            Imaging Results    None          Medications   aspirin tablet 325 mg (325 mg Oral Given 8/4/24 1830)   ketorolac injection 15 mg (15 mg Intravenous Given 8/4/24 1830)     Medical Decision Making  Patient has a leukocytosis of 20.  I wanted to rule out pneumonia but the patient left against medical advice.  He has been initially negative troponin but I was planning to repeat troponins.  Given that he has a cough leukocytosis I presumptively treat him with him with antibiotics and symptoms pharmacy although I explained to the patient he should not leave any should return emergency room and he is welcome at any time for further evaluation and workup.  Differential includes but is not limited to pneumonia pneumothorax acute heart failure acute coronary syndrome pulmonary embolism costochondritis aneurysm dissection.  Patient understands he is able to return any time.  He appears to be competent to make medical decisions for himself    Amount and/or Complexity of Data Reviewed  Labs: ordered.    Risk  OTC drugs.  Prescription drug management.               ED Course as of 08/05/24 0141   Sun Aug 04, 2024   4290 EKG independently interpreted by me as rate 98 normal sinus rhythm with PACs right bundle-branch block left anterior fascicular block  bifascicular block no ST segment elevation or depression. [JS]   1804 Troponin negative.  COVID and flu were negative.  Leukocytosis 20.  Awaiting chest x-ray PA and lateral. [JS]   1834 Patient wants to sign out against medical advice.  He states he needs to leave.  I instructed him that he needed a 2nd troponin and to await his chest x-ray.  He does have a cough over the past few days and I will give him a prescription for azithromycin given his leukocytosis and possible lower respiratory tract infection.  Patient understands his risk of death from potential cardiac disease and he is leaving prior to 2 troponins.  I explained him that I wanted a 2nd troponin.  Patient is still states he wants to go and has competency to make medical decisions for himself. [JS]      ED Course User Index  [JS] Francisco Llanos MD                           Clinical Impression:  Final diagnoses:  [R07.9] Chest pain  [J22] Lower respiratory tract infection (Primary)          ED Disposition Condition    AMA Stable                Francisco Llanos MD  08/05/24 0141

## 2024-08-07 LAB
OHS QRS DURATION: 130 MS
OHS QTC CALCULATION: 449 MS